# Patient Record
Sex: FEMALE | Race: WHITE
[De-identification: names, ages, dates, MRNs, and addresses within clinical notes are randomized per-mention and may not be internally consistent; named-entity substitution may affect disease eponyms.]

---

## 2018-03-14 ENCOUNTER — HOSPITAL ENCOUNTER (EMERGENCY)
Dept: HOSPITAL 88 - ER | Age: 28
Discharge: HOME | End: 2018-03-14
Payer: COMMERCIAL

## 2018-03-14 VITALS — BODY MASS INDEX: 40.97 KG/M2 | WEIGHT: 240 LBS | HEIGHT: 64 IN

## 2018-03-14 VITALS — SYSTOLIC BLOOD PRESSURE: 116 MMHG | DIASTOLIC BLOOD PRESSURE: 88 MMHG

## 2018-03-14 DIAGNOSIS — R05: Primary | ICD-10-CM

## 2018-03-14 DIAGNOSIS — J20.9: ICD-10-CM

## 2018-03-14 PROCEDURE — 93005 ELECTROCARDIOGRAM TRACING: CPT

## 2018-03-14 PROCEDURE — 81025 URINE PREGNANCY TEST: CPT

## 2018-03-14 PROCEDURE — 71046 X-RAY EXAM CHEST 2 VIEWS: CPT

## 2018-03-14 PROCEDURE — 99283 EMERGENCY DEPT VISIT LOW MDM: CPT

## 2018-03-14 NOTE — DIAGNOSTIC IMAGING REPORT
CHEST 2 VIEWS,    



Technique: CHEST 2 VIEWS

Comparison: None

Clinical history:  \S\COUGH X 1 WEEK \[id number] \S\2100 



DISCUSSION:

Low lung volumes.  Otherwise normal appearance of the heart, mediastinum, lungs

and pleural spaces.



IMPRESSION:

No acute abnormality



Signed by: Dr Krista Jackson MD on 3/14/2018 9:31 PM

## 2018-03-14 NOTE — XMS REPORT
Clinical Summary

 Created on: 2018



Isabel Brush

External Reference #: [id number]

: 1990

Sex: Female



Demographics







 Address  [address] [place]  [address]

 

 Home Phone  [phone]

 

 Preferred Language  English

 

 Marital Status  Single

 

 Orthodox Affiliation  Unknown

 

 Race  White

 

 Ethnic Group  Non-





Author







 Author  [facility]

 

 Organization  [facility]

 

 Address  Unknown

 

 Phone  Unavailable







Support







 Name  Relationship  Address  Phone

 

 Immanuel Voss  Unknown  [phone]







Care Team Providers







 Care Team Member Name  Role  Phone

 

 Asked, Pcp  PCP  Unavailable







Allergies







    



  Active Allergy   Reactions   Severity   Noted Date   Comments

 

    



  Hydrocodone     2017 







Current Medications







      



  Prescription   Sig.   Disp.   Refills   Start   End Date   Status



      Date  

 

      



  ondansetron ODT (ZOFRAN   Take 1 tablet (4 mg   15 tablet   0   20   



  ODT) 4 MG disintegrating   total) by mouth every 8     17   17 



  tablet   (eight) hours as needed     



   for nausea or vomiting     



   for up to 30 days.     







Active Problems





Not on file



Encounters







    



  Date   Type   Specialty   Care Team   Description

 

    



  2017   Emergency   Emergency Medicine   Lisa Ewign, NP-C   
Pharyngitis, unspecified



     Miguel Rodriguez   etiology (Primary Dx)



     MD Jasvir 



after 2017



Social History







    



  Tobacco Use   Types   Packs/Day   Years Used   Date

 

    



  Never Assessed    









 



  Sex Assigned at Birth   Date Recorded

 

 



  Not on file 







Last Filed Vital Signs







  



  Vital Sign   Reading   Time Taken

 

  



  Blood Pressure   125/84   2017 11:36 PM CDT

 

  



  Pulse   94   2017 11:36 PM CDT

 

  



  Temperature   35.9   C (96.7   F)   2017 11:36 PM CDT

 

  



  Respiratory Rate   18   2017 11:36 PM CDT

 

  



  Oxygen Saturation   98%   2017  6:08 PM CDT

 

  



  Inhaled Oxygen   -   -



  Concentration  

 

  



  Weight   -   -

 

  



  Height   162.6 cm (5' 4")   2017  6:09 PM CDT

 

  



  Body Mass Index   -   -







Plan of Treatment







   



  Health Maintenance   Due Date   Last Done   Comments

 

   



  PAP SMEAR   2013  

 

   



  INFLUENZA VACCINE   2017  







Results

* CT Soft Tissue Neck W Contrast (2017 10:33 PM)





 



  Specimen   Performing Laboratory

 

 



   [facility]



   [address] [place] [address]









 Narrative

 

 



EXAMINATION: CT SOFT TISSUE NECK W CONTRAST



 



CLINICAL HISTORY: dysphagia



 



COMPARISON:None



 



TECHNIQUE: Postcontrast enhanced imaging through the neck was performed from 
the upper chest through the skull base with coronal and sagittal reconstructed 
images.CT imaging was performed with iterative reconstruction technique and/
or automated 



exposure control to reduce radiation dose.



 



 



FINDINGS:



 



The partially imaged skull base is intact. The orbits are unremarkable.



 



Prevertebral soft tissues are within normal limits. Osseous structures are 
intact. The facet joints are intact without widening or subluxation.



 



The visualized aerodigestive tract shows no evidence of effacement or exophytic 
lesion. The visualized upper mediastinum is intact. Lung apices are clear.



 



There are some reactive lymph nodes in the left upper jugular chain/
jugulodigastric region. No grossly pathologically enlarged lymph nodes 
identified. There is no mass or fluid collection. There is no fat stranding. 
Salivary glands are intact.



 



Sinuses are clear. Mastoid air cells are clear.



 



IMPRESSION:



 



There are some reactive lymph nodes in the upper left neck. No pathologically 
enlarged lymph nodes identified. There is no abnormality to explain patient's 
dysphagia.



 



 



 



 



 



[id number]



 









 Procedure Note

 

 



Hm Interface, Radiology Results Incoming - 2017 10:45 PM CDT



EXAMINATION: CT SOFT TISSUE NECK W CONTRAST



CLINICAL HISTORY: dysphagia



COMPARISON:  None



TECHNIQUE: Postcontrast enhanced imaging through the neck was performed from 
the upper chest through the skull base with coronal and sagittal reconstructed 
images.  CT imaging was performed with iterative reconstruction technique and/
or automated 

exposure control to reduce radiation dose.





FINDINGS:



The partially imaged skull base is intact. The orbits are unremarkable.



Prevertebral soft tissues are within normal limits. Osseous structures are 
intact. The facet joints are intact without widening or subluxation.



The visualized aerodigestive tract shows no evidence of effacement or exophytic 
lesion. The visualized upper mediastinum is intact. Lung apices are clear.



There are some reactive lymph nodes in the left upper jugular chain/
jugulodigastric region. No grossly pathologically enlarged lymph nodes 
identified. There is no mass or fluid collection. There is no fat stranding. 
Salivary glands are intact.



Sinuses are clear. Mastoid air cells are clear.



IMPRESSION:



There are some reactive lymph nodes in the upper left neck. No pathologically 
enlarged lymph nodes identified. There is no abnormality to explain patient's 
dysphagia.











[id number]







* Group A strep, rapid antigen (2017  9:05 PM)





  



  Component   Value   Ref Range

 

  



  Group A strep, rapid   Negative for Group A Streptococcus antigen. 



  antigen result   Comment: 



   Specimen Information 



   Specimen Source: Throat 



   Specimen Site: Not otherwise specified 









 



  Specimen   Performing Laboratory

 

 



  Throat - Not otherwise   [facility] DEPARTMENT OF PATHOLOGY AND GENOMIC MEDICINE



  specified   [address]



   [place] [address]





* Strep screen culture (2017  9:05 PM)





  



  Component   Value   Ref Range

 

  



  Strep screen culture   No beta hemolytic Streptococci isolated 



  isolate   Comment: 



   Specimen Information 



   Specimen Source: Throat 



   Specimen Site: Not otherwise specified 









 



  Specimen   Performing Laboratory

 

 



  Throat - Not otherwise   [facility] DEPARTMENT OF PATHOLOGY AND GENOMIC MEDICINE



  specified   [address] [place] [address]





* Infectious mononucleosis screen (2017  8:36 PM)





  



  Component   Value   Ref Range

 

  



  Heterophile Ab screen   Negative   Negative









 



  Specimen   Performing Laboratory

 

 



  Blood   [facility] DEPARTMENT OF PATHOLOGY AND GENOMIC MEDICINE



   [address]



   [place] [address]





* Estimated GFR (2017  8:36 PM)





  



  Component   Value   Ref Range

 

  



  GFR Non Af Amer   88   mL/min/1.73 m2

 

  



  GFR Af Amer   >90   mL/min/1.73 m2



   Comment: 



   Chronic kidney disease: <60 mL/min/1.73m2 



   Kidney failure: <15 mL/min/1.73m2 



   The estimated GFR is calculated from the 



   IDMS-traceable Modification of Diet 



   in Renal Disease Equation. The accuracy of the 



   calculation is poor when the 



   creatinine is normal. Calculated values >90 



   mL/min/1.73m2 are not reported. 



   This equation has not been validated in children 



   (<18 years), pregnant 



   women, the elderly (>70 years), or ethnic groups 



   other than Caucasians and 



    Americans. 









 



  Specimen   Performing Laboratory

 

 



  Plasma specimen   [facility] DEPARTMENT OF PATHOLOGY AND GENOMIC MEDICINE



   [address]



   [place] [address]





* CBC with platelet and differential (2017  8:36 PM)





  



  Component   Value   Ref Range

 

  



  WBC   8.1   4.2 - 11.0 k/uL

 

  



  RBC   4.50   4.04 - 5.86 m/uL

 

  



  HGB   13.2   11.5 - 15.3 g/dL

 

  



  HCT   39.6   34.0 - 45.0 %

 

  



  MCV   88.0   80.0 - 98.0 fL

 

  



  MCH   29.3   27.0 - 34.0 pg

 

  



  MCHC   33.3   31.5 - 36.5 g/dL

 

  



  RDW - SD   41.9   37.0 - 51.0 fL

 

  



  MPV   10.8 (H)   7.4 - 10.4 fL

 

  



  Platelet count   335   150 - 400 k/uL

 

  



  Nucleated RBC   0.00   /100 WBC

 

  



  Neutrophils   53.0   36.0 - 66.0 %

 

  



  Lymphocytes   32.6   24.0 - 44.0 %

 

  



  Monocytes   8.2 (H)   0.0 - 6.0 %

 

  



  Eosinophils   5.9   0.0 - 6.0 %

 

  



  Basophils   0.2   0.0 - 1.2 %

 

  



  Immature granulocytes   0.1   0.0 - 1.0 %









 



  Specimen   Performing Laboratory

 

 



  Blood   [facility] DEPARTMENT OF PATHOLOGY AND GENOMIC MEDICINE



   [address]



   [place] [address]





* hCG qualitative, serum screen (2017  8:36 PM)





  



  Component   Value   Ref Range

 

  



  hCG qualitative, serum   Negative 



   Comment: 



   The manufacturers stated sensitivity of HcG test 



   for serum is >/=10 



   mIU/ml and urine is >/=20mIU/ml. 









 



  Specimen   Performing Laboratory

 

 



  Blood   [facility] DEPARTMENT OF PATHOLOGY AND GENOMIC MEDICINE



   [address]



   [place] [address]





* Comprehensive metabolic panel (2017  8:36 PM)





  



  Component   Value   Ref Range

 

  



  Sodium   140   135 - 150 mEq/L

 

  



  Potassium   3.9   3.5 - 5.0 mEq/L

 

  



  Chloride   104   100 - 109 mEq/L

 

  



  CO2   30   24 - 32 mmol/L

 

  



  Anion gap   6 (L)   7 - 15 mEq/L



   Comment: 



   Starting from  , anion gap 



   calculation 



   no longer incorporates potassium. Please note the 



   change. 

 

  



  BUN   9   7 - 18 mg/dL

 

  



  Creatinine   0.8   0.8 - 1.5 mg/dL

 

  



  Glucose   120 (H)   65 - 100 mg/dL

 

  



  Calcium   8.9   8.6 - 10.7 mg/dL

 

  



  Protein   7.9   6.3 - 8.2 g/dL

 

  



  Albumin   4.0   3.2 - 5.0 g/dL

 

  



  A/G ratio   1.0   0.7 - 3.8

 

  



  Alkaline phosphatase   66   30 - 120 U/L

 

  



  AST   53 (H)   15 - 37 U/L

 

  



  ALT   90 (H)   30 - 65 U/L

 

  



  Total bilirubin   0.3   0.2 - 1.2 mg/dL









 



  Specimen   Performing Laboratory

 

 



  Plasma specimen   [facility] DEPARTMENT OF PATHOLOGY AND GENOMIC MEDICINE



   [address]



   [place] [address]





after 2017



Insurance







     



  Payer   Benefit   Subscriber ID   Type   Phone   Address



   Plan /    



   Group    

 

     



  [employer]   xxxxxxxxx   O  



   [facility]    



   [facility]    









     



  Guarantor Name   Account   Relation to   Date of   Phone   Billing Address



   Type   Patient   Birth  

 

     



  ISABEL BRUSH   Personal/F   Self   1990   Home:   [address]



   [facility]     [phone]   [place] [address]